# Patient Record
Sex: FEMALE | Race: WHITE | NOT HISPANIC OR LATINO | URBAN - METROPOLITAN AREA
[De-identification: names, ages, dates, MRNs, and addresses within clinical notes are randomized per-mention and may not be internally consistent; named-entity substitution may affect disease eponyms.]

---

## 2017-11-19 ENCOUNTER — INPATIENT (INPATIENT)
Facility: HOSPITAL | Age: 82
LOS: 0 days | Discharge: HOME | DRG: 689 | End: 2017-11-20
Attending: INTERNAL MEDICINE | Admitting: INTERNAL MEDICINE
Payer: MEDICARE

## 2017-11-19 VITALS
RESPIRATION RATE: 18 BRPM | SYSTOLIC BLOOD PRESSURE: 118 MMHG | TEMPERATURE: 99 F | OXYGEN SATURATION: 94 % | DIASTOLIC BLOOD PRESSURE: 78 MMHG | HEART RATE: 105 BPM

## 2017-11-19 DIAGNOSIS — N39.0 URINARY TRACT INFECTION, SITE NOT SPECIFIED: ICD-10-CM

## 2017-11-19 DIAGNOSIS — E78.5 HYPERLIPIDEMIA, UNSPECIFIED: ICD-10-CM

## 2017-11-19 DIAGNOSIS — Z29.9 ENCOUNTER FOR PROPHYLACTIC MEASURES, UNSPECIFIED: ICD-10-CM

## 2017-11-19 DIAGNOSIS — A41.9 SEPSIS, UNSPECIFIED ORGANISM: ICD-10-CM

## 2017-11-19 DIAGNOSIS — R63.8 OTHER SYMPTOMS AND SIGNS CONCERNING FOOD AND FLUID INTAKE: ICD-10-CM

## 2017-11-19 LAB
ALBUMIN SERPL ELPH-MCNC: 3.8 G/DL — SIGNIFICANT CHANGE UP (ref 3.3–5)
ALP SERPL-CCNC: 59 U/L — SIGNIFICANT CHANGE UP (ref 40–120)
ALT FLD-CCNC: 11 U/L — SIGNIFICANT CHANGE UP (ref 10–45)
ANION GAP SERPL CALC-SCNC: 15 MMOL/L — SIGNIFICANT CHANGE UP (ref 5–17)
APPEARANCE UR: CLEAR — SIGNIFICANT CHANGE UP
AST SERPL-CCNC: 20 U/L — SIGNIFICANT CHANGE UP (ref 10–40)
BACTERIA # UR AUTO: (no result) /HPF
BASOPHILS NFR BLD AUTO: 1 % — SIGNIFICANT CHANGE UP (ref 0–2)
BILIRUB SERPL-MCNC: 0.6 MG/DL — SIGNIFICANT CHANGE UP (ref 0.2–1.2)
BILIRUB UR-MCNC: NEGATIVE — SIGNIFICANT CHANGE UP
BUN SERPL-MCNC: 17 MG/DL — SIGNIFICANT CHANGE UP (ref 7–23)
CALCIUM SERPL-MCNC: 9 MG/DL — SIGNIFICANT CHANGE UP (ref 8.4–10.5)
CHLORIDE SERPL-SCNC: 97 MMOL/L — SIGNIFICANT CHANGE UP (ref 96–108)
CO2 SERPL-SCNC: 25 MMOL/L — SIGNIFICANT CHANGE UP (ref 22–31)
COLOR SPEC: YELLOW — SIGNIFICANT CHANGE UP
CREAT SERPL-MCNC: 0.88 MG/DL — SIGNIFICANT CHANGE UP (ref 0.5–1.3)
DIFF PNL FLD: NEGATIVE — SIGNIFICANT CHANGE UP
EOSINOPHIL NFR BLD AUTO: 0.2 % — SIGNIFICANT CHANGE UP (ref 0–6)
EPI CELLS # UR: SIGNIFICANT CHANGE UP /HPF (ref 0–5)
GLUCOSE SERPL-MCNC: 130 MG/DL — HIGH (ref 70–99)
GLUCOSE UR QL: NEGATIVE — SIGNIFICANT CHANGE UP
HCT VFR BLD CALC: 42.1 % — SIGNIFICANT CHANGE UP (ref 34.5–45)
HGB BLD-MCNC: 14 G/DL — SIGNIFICANT CHANGE UP (ref 11.5–15.5)
KETONES UR-MCNC: NEGATIVE — SIGNIFICANT CHANGE UP
LACTATE SERPL-SCNC: 2 MMOL/L — SIGNIFICANT CHANGE UP (ref 0.5–2)
LACTATE SERPL-SCNC: 2.8 MMOL/L — HIGH (ref 0.5–2)
LEUKOCYTE ESTERASE UR-ACNC: (no result)
LYMPHOCYTES # BLD AUTO: 4 % — LOW (ref 13–44)
MACROCYTES BLD QL: SLIGHT — SIGNIFICANT CHANGE UP
MCHC RBC-ENTMCNC: 33.3 G/DL — SIGNIFICANT CHANGE UP (ref 32–36)
MCHC RBC-ENTMCNC: 35.6 PG — HIGH (ref 27–34)
MCV RBC AUTO: 107.1 FL — HIGH (ref 80–100)
MONOCYTES NFR BLD AUTO: 9 % — SIGNIFICANT CHANGE UP (ref 2–14)
NEUTROPHILS NFR BLD AUTO: 79 % — HIGH (ref 43–77)
NEUTS BAND # BLD: 7 % — SIGNIFICANT CHANGE UP
NITRITE UR-MCNC: POSITIVE
PH UR: 6.5 — SIGNIFICANT CHANGE UP (ref 5–8)
PLAT MORPH BLD: NORMAL — SIGNIFICANT CHANGE UP
PLATELET # BLD AUTO: 270 K/UL — SIGNIFICANT CHANGE UP (ref 150–400)
POTASSIUM SERPL-MCNC: 4.2 MMOL/L — SIGNIFICANT CHANGE UP (ref 3.5–5.3)
POTASSIUM SERPL-SCNC: 4.2 MMOL/L — SIGNIFICANT CHANGE UP (ref 3.5–5.3)
PROT SERPL-MCNC: 7.9 G/DL — SIGNIFICANT CHANGE UP (ref 6–8.3)
PROT UR-MCNC: NEGATIVE MG/DL — SIGNIFICANT CHANGE UP
RAPID RVP RESULT: SIGNIFICANT CHANGE UP
RBC # BLD: 3.93 M/UL — SIGNIFICANT CHANGE UP (ref 3.8–5.2)
RBC # FLD: 12.8 % — SIGNIFICANT CHANGE UP (ref 10.3–16.9)
RBC BLD AUTO: (no result)
RBC CASTS # UR COMP ASSIST: < 5 /HPF — SIGNIFICANT CHANGE UP
SODIUM SERPL-SCNC: 137 MMOL/L — SIGNIFICANT CHANGE UP (ref 135–145)
SP GR SPEC: <=1.005 — SIGNIFICANT CHANGE UP (ref 1–1.03)
UROBILINOGEN FLD QL: 0.2 E.U./DL — SIGNIFICANT CHANGE UP
WBC # BLD: 18.2 K/UL — HIGH (ref 3.8–10.5)
WBC # FLD AUTO: 18.2 K/UL — HIGH (ref 3.8–10.5)
WBC UR QL: (no result) /HPF

## 2017-11-19 PROCEDURE — 93010 ELECTROCARDIOGRAM REPORT: CPT

## 2017-11-19 PROCEDURE — 99222 1ST HOSP IP/OBS MODERATE 55: CPT | Mod: GC

## 2017-11-19 PROCEDURE — 99285 EMERGENCY DEPT VISIT HI MDM: CPT | Mod: 25

## 2017-11-19 PROCEDURE — 70450 CT HEAD/BRAIN W/O DYE: CPT | Mod: 26

## 2017-11-19 PROCEDURE — 71020: CPT | Mod: 26

## 2017-11-19 RX ORDER — ATORVASTATIN CALCIUM 80 MG/1
20 TABLET, FILM COATED ORAL AT BEDTIME
Qty: 0 | Refills: 0 | Status: DISCONTINUED | OUTPATIENT
Start: 2017-11-19 | End: 2017-11-20

## 2017-11-19 RX ORDER — SODIUM CHLORIDE 9 MG/ML
1000 INJECTION INTRAMUSCULAR; INTRAVENOUS; SUBCUTANEOUS ONCE
Qty: 0 | Refills: 0 | Status: COMPLETED | OUTPATIENT
Start: 2017-11-19 | End: 2017-11-19

## 2017-11-19 RX ORDER — INFLUENZA VIRUS VACCINE 15; 15; 15; 15 UG/.5ML; UG/.5ML; UG/.5ML; UG/.5ML
0.5 SUSPENSION INTRAMUSCULAR ONCE
Qty: 0 | Refills: 0 | Status: DISCONTINUED | OUTPATIENT
Start: 2017-11-19 | End: 2017-11-20

## 2017-11-19 RX ORDER — GABAPENTIN 400 MG/1
300 CAPSULE ORAL DAILY
Qty: 0 | Refills: 0 | Status: DISCONTINUED | OUTPATIENT
Start: 2017-11-19 | End: 2017-11-20

## 2017-11-19 RX ORDER — LANOLIN ALCOHOL/MO/W.PET/CERES
3 CREAM (GRAM) TOPICAL AT BEDTIME
Qty: 0 | Refills: 0 | Status: DISCONTINUED | OUTPATIENT
Start: 2017-11-19 | End: 2017-11-20

## 2017-11-19 RX ORDER — ACETAMINOPHEN 500 MG
650 TABLET ORAL EVERY 6 HOURS
Qty: 0 | Refills: 0 | Status: DISCONTINUED | OUTPATIENT
Start: 2017-11-19 | End: 2017-11-20

## 2017-11-19 RX ORDER — CEFTRIAXONE 500 MG/1
1 INJECTION, POWDER, FOR SOLUTION INTRAMUSCULAR; INTRAVENOUS ONCE
Qty: 0 | Refills: 0 | Status: COMPLETED | OUTPATIENT
Start: 2017-11-19 | End: 2017-11-19

## 2017-11-19 RX ORDER — CEFTRIAXONE 500 MG/1
1 INJECTION, POWDER, FOR SOLUTION INTRAMUSCULAR; INTRAVENOUS EVERY 24 HOURS
Qty: 0 | Refills: 0 | Status: DISCONTINUED | OUTPATIENT
Start: 2017-11-20 | End: 2017-11-20

## 2017-11-19 RX ORDER — AZITHROMYCIN 500 MG/1
500 TABLET, FILM COATED ORAL ONCE
Qty: 0 | Refills: 0 | Status: COMPLETED | OUTPATIENT
Start: 2017-11-19 | End: 2017-11-19

## 2017-11-19 RX ADMIN — ATORVASTATIN CALCIUM 20 MILLIGRAM(S): 80 TABLET, FILM COATED ORAL at 22:01

## 2017-11-19 RX ADMIN — Medication 3 MILLIGRAM(S): at 23:51

## 2017-11-19 RX ADMIN — SODIUM CHLORIDE 2000 MILLILITER(S): 9 INJECTION INTRAMUSCULAR; INTRAVENOUS; SUBCUTANEOUS at 14:35

## 2017-11-19 RX ADMIN — SODIUM CHLORIDE 2000 MILLILITER(S): 9 INJECTION INTRAMUSCULAR; INTRAVENOUS; SUBCUTANEOUS at 15:41

## 2017-11-19 RX ADMIN — AZITHROMYCIN 255 MILLIGRAM(S): 500 TABLET, FILM COATED ORAL at 15:40

## 2017-11-19 RX ADMIN — CEFTRIAXONE 100 GRAM(S): 500 INJECTION, POWDER, FOR SOLUTION INTRAMUSCULAR; INTRAVENOUS at 14:50

## 2017-11-19 NOTE — H&P ADULT - PROBLEM SELECTOR PLAN 5
DVT PPX: Improve Score 1, no PPX needed  Ethics: Full Code  Disposition: Admit to [facility] for further management of care.

## 2017-11-19 NOTE — H&P ADULT - NSHPLABSRESULTS_GEN_ALL_CORE
LABS:                        14.0   18.2  )-----------( 270      ( 19 Nov 2017 14:47 )             42.1     11-19    137  |  97  |  17  ----------------------------<  130<H>  4.2   |  25  |  0.88    Ca    9.0      19 Nov 2017 14:47    TPro  7.9  /  Alb  3.8  /  TBili  0.6  /  DBili  x   /  AST  20  /  ALT  11  /  AlkPhos  59  11-19      Urinalysis Basic - ( 19 Nov 2017 16:49 )    Color: Yellow / Appearance: Clear / SG: <=1.005 / pH: x  Gluc: x / Ketone: NEGATIVE  / Bili: Negative / Urobili: 0.2 E.U./dL   Blood: x / Protein: NEGATIVE mg/dL / Nitrite: POSITIVE   Leuk Esterase: Trace / RBC: < 5 /HPF / WBC 5-10 /HPF   Sq Epi: x / Non Sq Epi: 0-5 /HPF / Bacteria: Many /HPF

## 2017-11-19 NOTE — H&P ADULT - ATTENDING COMMENTS
Agree with above note.    82 year old woman admitted sepsis secondary to UTI with altered mental status, fevers, and generalized weakness now all improved.    -Ceftriaxone 1g IV daily.  F/U urine cultures and deescalate based on culture data.  F/U blood cultures.  -Repeat CBC in the AM  -Tylenol as needed for fever  -Encourage ambulation and OOB to chair.  -Regular diet  -Restart crestor

## 2017-11-19 NOTE — H&P ADULT - PROBLEM SELECTOR PLAN 1
Patient with complaints of nonproductive cough x 3 days, shaking chills last night, urinary frequency x 3 days and generalized weakness since this morning.   - Severe Sepsis criteria: (WBC 18.2, Lactate 2.8, T 100.4, +UA)  - Ceftriaxone 1g x 1 dose, Azithromycin 500mg x 1 dose for CAP in ED. Lactate 2.8 2L NS. Lactate s/p fluids 2.0. CXR unremarkable. CT Head: No intracranial hemorrhage or calvarial fracture. UA positive.  - f/u urine culture, blood culture  - F/u AM CBC/BMP  - c/w Ceftriaxone 1g q24h  - RVP ordered  - Tylenol for fever > 100.4

## 2017-11-19 NOTE — H&P ADULT - ASSESSMENT
An 83 y/o female with PMH of HLD presented to [facility] with complaints of nonproductive cough x 3 days, shaking chills last night, urinary frequency x 3 days and generalized weakness since this morning, found to have a positive UA, now admitted for UTI.

## 2017-11-19 NOTE — H&P ADULT - HISTORY OF PRESENT ILLNESS
Vital Signs Last 12 Hrs  T(F): 97.8 (11-19-17 @ 16:29), Max: 100.4 (11-19-17 @ 14:31)  HR: 87 (11-19-17 @ 16:29) (87 - 105)  BP: 121/77 (11-19-17 @ 16:29) (112/60 - 121/77)  BP(mean): --  RR: 18 (11-19-17 @ 16:29) (18 - 18)  SpO2: 97% (11-19-17 @ 16:29) (94% - 97%)  I&O's Summary An 81 y/o female with PMH of HLD presented to [facility] with complaints of nonproductive cough x 3 days, shaking chills last night, urinary frequency x 3 days and generalized weakness since this morning. Patient was unable to walk, felt her legs were giving up and could not hold her. Her  assisted her to the bathroom. Denies fevers, CP, SOB, lightheadedness, dizziness, vision or hearing changes, neck stiffness, abdominal pain, N/V/D, urinary hesitancy or burning with urinary, lower extremity swelling. No recent sick contacts. No recent travel. No pets. No flu shot this year.     In [facility] ED, VS were: T 97.8 (Max: 100.4 at 2pm), HR: 87, BP: 121/77, RR: 18, SpO2: 97% on 2L NC. Ceftriaxone 1g x 1 dose, Azithromycin 500mg x 1 dose for CAP. Laste 2.8 2L NS. Lactate s/p fluids 2.0. CXR unremarkable. CT Head: No intracranial hemorrhage or calvarial fracture. UA positive.    Admitted to [facility] for further management of care. An 83 y/o female with PMH of HLD presented to [facility] with complaints of nonproductive cough x 3 days, shaking chills last night, urinary frequency x 3 days and generalized weakness since this morning. Patient was unable to walk, felt her legs were giving up and could not hold her. Her  assisted her to the bathroom. Denies fevers, CP, SOB, lightheadedness, dizziness, vision or hearing changes, neck stiffness, abdominal pain, N/V/D, urinary hesitancy or burning with urinary, lower extremity swelling. No recent sick contacts. No recent travel. No pets. No flu shot this year.     In the ED, VS were: T 97.8 (Max: 100.4 at 2pm), HR: 87, BP: 121/77, RR: 18, SpO2: 97% on 2L NC. Ceftriaxone 1g x 1 dose, Azithromycin 500mg x 1 dose for CAP. Lactate 2.8 2L NS. Lactate s/p fluids 2.0. CXR unremarkable. CT Head: No intracranial hemorrhage or calvarial fracture. UA positive.    Admitted to [email or web address] for further management of care.

## 2017-11-19 NOTE — ED PROVIDER NOTE - MEDICAL DECISION MAKING DETAILS
weakness, chills, fever, and cough with hx of fall. ct head no acute pathology. likely sepsis. labs noted. antibiotics given and cx sent. + UTI on u/a. will plan for admission

## 2017-11-19 NOTE — H&P ADULT - PROBLEM SELECTOR PLAN 4
F: No IVF, tolerates PO diet  E: Replete electrolytes PRN  N: DASH/TLC F: No IVF, tolerates PO diet  E: Replete electrolytes PRN  N: Regular Diet

## 2017-11-19 NOTE — H&P ADULT - NSHPOUTPATIENTPROVIDERS_GEN_ALL_CORE
PCP: PCP: [facility]: [address], [place] [address]  Phone: [phone] PCP: Justin Mixon [phone]

## 2017-11-19 NOTE — H&P ADULT - NSHPSOCIALHISTORY_GEN_ALL_CORE
Cigarette Use:  ETOH Use:  Recreational Drug Use:  Occupation:   Sexual History: Cigarette Use: Former smoker, quit 20 years ago  ETOH Use: Social drinker, 1 glass of scotch with water a night  Recreational Drug Use: Denies cocaine, marijuana use  Occupation: Retired  Sexual History: Denies

## 2017-11-19 NOTE — ED ADULT NURSE NOTE - OBJECTIVE STATEMENT
Pt presents to ED c/o weakness. Per pt and  pt with wet non productive cough for the last few days, pt also with chills last night. Per  early this morning approx 4 am per  pt needed assistance out of bed which is unusual for her, pt was too weak to walk to the bathroom and ended up falling onto her buttocks and urinated on herself. On arrival to ED pt endorses generalized weakness but denies pain. Pt denies HA, dizziness, lightheadedness, visual changes, numbness/tingling, neck pain, CP, SOB, N/V/D, difficulty urinating, difficulty passing BM. Pt presents in NAD speaking full sentences via EMS stretcher through triage. EKG preformed on arrival to bed 16.

## 2017-11-19 NOTE — H&P ADULT - NSHPPHYSICALEXAM_GEN_ALL_CORE
PHYSICAL EXAM:    Constitutional: NAD, AAOx3, comfortable in bed.   Respiratory: Normal rate, rhythm, depth, effort. CTAB. No w/r/r.   Cardiovascular: RRR, normal S1 and S2, no m/r/g.   Gastrointestinal: +BS, soft NTND.   Extremities: wwp, no edema.   Vascular: Pulses equal and strong throughout.   Neurological: Cranial nerves grossly intact, strength and sensation intact throughout.   Skin: No gross skin abnormalities. PHYSICAL EXAM:    Constitutional: NAD, AAOx3, comfortable in bed.   HEENT: EOMI, PERRL, MMM, anicteric, no JVD, no LAD, neck supple  Respiratory: Normal rate, rhythm, depth, effort. CTAB. No w/r/r.   Cardiovascular: RRR, normal S1 and S2, no m/r/g.   Gastrointestinal: +BS x 4, soft NTND. No guarding or rigidity  : no CVA tenderness, no suprapubic tenderness  Extremities: wwp, no peripheral edema.   Vascular: Pulses equal and strong throughout.   Neurological: Cranial nerves grossly intact, strength and sensation intact throughout.   Skin: No gross skin abnormalities. PHYSICAL EXAM:    Constitutional: NAD, AAOx3, comfortable in bed.   HEENT: EOMI, PERRL, MMM, anicteric, no JVD, no LAD, neck supple  Respiratory: Normal rate, rhythm, depth, effort. CTAB. No w/r/r.   Cardiovascular: RRR, normal S1 and S2, no m/r/g.   Gastrointestinal: +BS x 4, soft NTND. No guarding or rigidity  : no CVA tenderness, minimal suprapubic tenderness  Extremities: wwp, no peripheral edema.   Vascular: Pulses equal and strong throughout.   Neurological: Cranial nerves grossly intact, strength and sensation intact throughout.   Skin: No gross skin abnormalities.

## 2017-11-19 NOTE — ED ADULT TRIAGE NOTE - ARRIVAL INFO ADDITIONAL COMMENTS
Patient reports pain to right hip s/p fall. EMS reports patient's O2 sat on their arrival was 91% on room air. Reports cough x few weeks. Spouse reports patient has been more sleepy today than usual and reports chills last night. Patient is A&Ox3 on arrival. Reports head injury, denies any LOC, dizziness, or headache. Patient presents with IV#20g to left hand, received 250ml NS in the field.

## 2017-11-19 NOTE — ED PROVIDER NOTE - OBJECTIVE STATEMENT
83 y/o female with hx of HLD c/o weakness. pt states non productive cough for few days. pt reports feeling weak since 4 am today and  reports she has not been answering questions well. + chills and ? fever.  assisting pt to restroom today and pt with multiple falls. no hip or leg pain. no abd pain, n/v/d. no ha or dizziness. no urinary sx's.  no further complaints.

## 2017-11-20 VITALS
DIASTOLIC BLOOD PRESSURE: 83 MMHG | TEMPERATURE: 97 F | RESPIRATION RATE: 18 BRPM | OXYGEN SATURATION: 96 % | SYSTOLIC BLOOD PRESSURE: 125 MMHG | HEART RATE: 78 BPM

## 2017-11-20 LAB
ANION GAP SERPL CALC-SCNC: 12 MMOL/L — SIGNIFICANT CHANGE UP (ref 5–17)
BUN SERPL-MCNC: 15 MG/DL — SIGNIFICANT CHANGE UP (ref 7–23)
CALCIUM SERPL-MCNC: 8.3 MG/DL — LOW (ref 8.4–10.5)
CHLORIDE SERPL-SCNC: 106 MMOL/L — SIGNIFICANT CHANGE UP (ref 96–108)
CO2 SERPL-SCNC: 22 MMOL/L — SIGNIFICANT CHANGE UP (ref 22–31)
CREAT SERPL-MCNC: 0.65 MG/DL — SIGNIFICANT CHANGE UP (ref 0.5–1.3)
GLUCOSE SERPL-MCNC: 97 MG/DL — SIGNIFICANT CHANGE UP (ref 70–99)
HCT VFR BLD CALC: 36.2 % — SIGNIFICANT CHANGE UP (ref 34.5–45)
HGB BLD-MCNC: 11.5 G/DL — SIGNIFICANT CHANGE UP (ref 11.5–15.5)
MAGNESIUM SERPL-MCNC: 2.2 MG/DL — SIGNIFICANT CHANGE UP (ref 1.6–2.6)
MCHC RBC-ENTMCNC: 31.8 G/DL — LOW (ref 32–36)
MCHC RBC-ENTMCNC: 34.8 PG — HIGH (ref 27–34)
MCV RBC AUTO: 109.7 FL — HIGH (ref 80–100)
PHOSPHATE SERPL-MCNC: 2.2 MG/DL — LOW (ref 2.5–4.5)
PLATELET # BLD AUTO: 219 K/UL — SIGNIFICANT CHANGE UP (ref 150–400)
POTASSIUM SERPL-MCNC: 3.4 MMOL/L — LOW (ref 3.5–5.3)
POTASSIUM SERPL-SCNC: 3.4 MMOL/L — LOW (ref 3.5–5.3)
RBC # BLD: 3.3 M/UL — LOW (ref 3.8–5.2)
RBC # FLD: 12.7 % — SIGNIFICANT CHANGE UP (ref 10.3–16.9)
SODIUM SERPL-SCNC: 140 MMOL/L — SIGNIFICANT CHANGE UP (ref 135–145)
WBC # BLD: 6.8 K/UL — SIGNIFICANT CHANGE UP (ref 3.8–10.5)
WBC # FLD AUTO: 6.8 K/UL — SIGNIFICANT CHANGE UP (ref 3.8–10.5)

## 2017-11-20 PROCEDURE — 99239 HOSP IP/OBS DSCHRG MGMT >30: CPT

## 2017-11-20 RX ORDER — ROSUVASTATIN CALCIUM 5 MG/1
1 TABLET ORAL
Qty: 0 | Refills: 0 | COMMUNITY

## 2017-11-20 RX ORDER — ROSUVASTATIN CALCIUM 5 MG/1
1 TABLET ORAL
Qty: 30 | Refills: 1 | OUTPATIENT
Start: 2017-11-20 | End: 2018-01-18

## 2017-11-20 RX ORDER — CEFPODOXIME PROXETIL 100 MG
1 TABLET ORAL
Qty: 10 | Refills: 0 | OUTPATIENT
Start: 2017-11-20 | End: 2017-11-25

## 2017-11-20 RX ORDER — POTASSIUM CHLORIDE 20 MEQ
20 PACKET (EA) ORAL
Qty: 0 | Refills: 0 | Status: COMPLETED | OUTPATIENT
Start: 2017-11-20 | End: 2017-11-20

## 2017-11-20 RX ORDER — ACETAMINOPHEN 500 MG
650 TABLET ORAL ONCE
Qty: 0 | Refills: 0 | Status: COMPLETED | OUTPATIENT
Start: 2017-11-20 | End: 2017-11-20

## 2017-11-20 RX ORDER — GABAPENTIN 400 MG/1
1 CAPSULE ORAL
Qty: 90 | Refills: 0 | OUTPATIENT
Start: 2017-11-20 | End: 2017-12-20

## 2017-11-20 RX ORDER — GABAPENTIN 400 MG/1
1 CAPSULE ORAL
Qty: 0 | Refills: 0 | COMMUNITY

## 2017-11-20 RX ADMIN — GABAPENTIN 300 MILLIGRAM(S): 400 CAPSULE ORAL at 11:36

## 2017-11-20 RX ADMIN — Medication 20 MILLIEQUIVALENT(S): at 14:11

## 2017-11-20 RX ADMIN — CEFTRIAXONE 100 GRAM(S): 500 INJECTION, POWDER, FOR SOLUTION INTRAMUSCULAR; INTRAVENOUS at 10:23

## 2017-11-20 RX ADMIN — Medication 650 MILLIGRAM(S): at 02:03

## 2017-11-20 RX ADMIN — Medication 20 MILLIEQUIVALENT(S): at 11:36

## 2017-11-20 RX ADMIN — Medication 650 MILLIGRAM(S): at 12:30

## 2017-11-20 RX ADMIN — Medication 650 MILLIGRAM(S): at 11:36

## 2017-11-20 RX ADMIN — Medication 20 MILLIEQUIVALENT(S): at 10:23

## 2017-11-20 NOTE — DISCHARGE NOTE ADULT - ADDITIONAL INSTRUCTIONS
Please follow up with your PCP for further management of care. Please return to the ED if symptoms of urinary burning and frequency do not resolve.

## 2017-11-20 NOTE — PHYSICAL THERAPY INITIAL EVALUATION ADULT - PERTINENT HX OF CURRENT PROBLEM, REHAB EVAL
Pt. is an 82 y.o. female admitted with c/o dry cough, increased urinary frequency x 3 days, gen. weakness. Pt. was found to be septic due to UTI on admission..

## 2017-11-20 NOTE — DISCHARGE NOTE ADULT - CARE PROVIDER_API CALL
Justin Mixon  [address] [place] [address] [address], [place] [address]  Phone: [phone]  Fax: (       -

## 2017-11-20 NOTE — DISCHARGE NOTE ADULT - PATIENT PORTAL LINK FT
“You can access the Follow[employer] Patient Portal, offered by [facility], by registering with the following website: [email or web address]”

## 2017-11-20 NOTE — DISCHARGE NOTE ADULT - PLAN OF CARE
Decrease infection You presented with complaints of nonproductive cough for 3 days, shaking chills, urinary frequency x 3 days and generalized weakness. You met severe sepsis criteria: (WBC 18.2, Lactate 2.8, T 100.4, Positive Urine Analysis). We started you on antibiotics (Ceftriaxone 1g daily) and gave you fluids in the ED. Chest xray was unremarkable. CT Head showed no intracranial hemorrhage or calvarial fracture. Respiratory viral panel was negative. We gave you Tylenol for fever. Please continue to take Cefpodoxamine 100mg twice a day for 5 days. Please follow up with your PCP for further management of care. Please return to the ED if symptoms of urinary burning and frequency do not resolve. Manage lipid levels Continue taking Crestor 20mg daily. Please follow up with your PCP for further management of care.

## 2017-11-20 NOTE — DISCHARGE NOTE ADULT - HOSPITAL COURSE
An 81 y/o female with PMH of HLD presented to [facility] with complaints of nonproductive cough x 3 days, shaking chills last night, urinary frequency x 3 days and generalized weakness since this morning. Patient was unable to walk, felt her legs were giving up and could not hold her. Her  assisted her to the bathroom. Denies fevers, CP, SOB, lightheadedness, dizziness, vision or hearing changes, neck stiffness, abdominal pain, N/V/D, urinary hesitancy or burning with urinary, lower extremity swelling. No recent sick contacts. No recent travel. No pets. No flu shot this year. In the ED, VS were: T 97.8 (Max: 100.4 at 2pm), HR: 87, BP: 121/77, RR: 18, SpO2: 97% on 2L NC. Ceftriaxone 1g x 1 dose, Azithromycin 500mg x 1 dose for CAP. Lactate 2.8 2L NS. Lactate s/p fluids 2.0. CXR unremarkable. CT Head: No intracranial hemorrhage or calvarial fracture. UA positive. RVP negative. Tylenol for Fever. Admitted to [facility] for severe sepsis 2/2 UTI.     Patient hemodynamically stable and ready for discharge with cefpodoxime 100mg BID for 5 days.

## 2017-11-20 NOTE — DISCHARGE NOTE ADULT - PROVIDER TOKENS
FREE:[LAST:[[facility]],FIRST:[Justin],PHONE:[phone]],FAX:[(   )    -],ADDRESS:[[address] [place] [address]]]

## 2017-11-20 NOTE — DISCHARGE NOTE ADULT - MEDICATION SUMMARY - MEDICATIONS TO TAKE
I will START or STAY ON the medications listed below when I get home from the hospital:    gabapentin 300 mg oral capsule  -- 1 cap(s) by mouth 3 times a day  -- Indication: For Nutrition, metabolism, and development symptoms    Crestor 20 mg oral tablet  -- 1 tab(s) by mouth once a day (at bedtime)  -- Indication: For HLD (hyperlipidemia)    cefpodoxime 100 mg oral tablet  -- 1 tab(s) by mouth 2 times a day for 5 days  -- Finish all this medication unless otherwise directed by prescriber.  Take with food or milk.    -- Indication: For UTI (urinary tract infection)

## 2017-11-20 NOTE — DISCHARGE NOTE ADULT - CARE PLAN
Principal Discharge DX:	UTI (urinary tract infection)  Goal:	Decrease infection  Instructions for follow-up, activity and diet:	You presented with complaints of nonproductive cough for 3 days, shaking chills, urinary frequency x 3 days and generalized weakness. You met severe sepsis criteria: (WBC 18.2, Lactate 2.8, T 100.4, Positive Urine Analysis). We started you on antibiotics (Ceftriaxone 1g daily) and gave you fluids in the ED. Chest xray was unremarkable. CT Head showed no intracranial hemorrhage or calvarial fracture. Respiratory viral panel was negative. We gave you Tylenol for fever. Please continue to take Cefpodoxamine 100mg twice a day for 5 days. Please follow up with your PCP for further management of care. Please return to the ED if symptoms of urinary burning and frequency do not resolve.  Secondary Diagnosis:	HLD (hyperlipidemia)  Goal:	Manage lipid levels  Instructions for follow-up, activity and diet:	Continue taking Crestor 20mg daily. Please follow up with your PCP for further management of care.

## 2017-11-21 LAB
CULTURE RESULTS: SIGNIFICANT CHANGE UP
SPECIMEN SOURCE: SIGNIFICANT CHANGE UP

## 2017-11-22 DIAGNOSIS — Z88.0 ALLERGY STATUS TO PENICILLIN: ICD-10-CM

## 2017-11-22 DIAGNOSIS — G93.41 METABOLIC ENCEPHALOPATHY: ICD-10-CM

## 2017-11-22 DIAGNOSIS — N39.0 URINARY TRACT INFECTION, SITE NOT SPECIFIED: ICD-10-CM

## 2017-11-22 DIAGNOSIS — Z28.21 IMMUNIZATION NOT CARRIED OUT BECAUSE OF PATIENT REFUSAL: ICD-10-CM

## 2017-11-22 DIAGNOSIS — D53.9 NUTRITIONAL ANEMIA, UNSPECIFIED: ICD-10-CM

## 2017-11-22 DIAGNOSIS — Z88.2 ALLERGY STATUS TO SULFONAMIDES: ICD-10-CM

## 2017-11-22 DIAGNOSIS — Z91.81 HISTORY OF FALLING: ICD-10-CM

## 2017-11-22 DIAGNOSIS — E78.5 HYPERLIPIDEMIA, UNSPECIFIED: ICD-10-CM

## 2017-11-22 DIAGNOSIS — E87.6 HYPOKALEMIA: ICD-10-CM

## 2017-11-25 LAB
CULTURE RESULTS: SIGNIFICANT CHANGE UP
CULTURE RESULTS: SIGNIFICANT CHANGE UP
SPECIMEN SOURCE: SIGNIFICANT CHANGE UP
SPECIMEN SOURCE: SIGNIFICANT CHANGE UP

## 2017-12-19 PROCEDURE — 87633 RESP VIRUS 12-25 TARGETS: CPT

## 2017-12-19 PROCEDURE — 87798 DETECT AGENT NOS DNA AMP: CPT

## 2017-12-19 PROCEDURE — 71046 X-RAY EXAM CHEST 2 VIEWS: CPT

## 2017-12-19 PROCEDURE — 87086 URINE CULTURE/COLONY COUNT: CPT

## 2017-12-19 PROCEDURE — 80053 COMPREHEN METABOLIC PANEL: CPT

## 2017-12-19 PROCEDURE — 36415 COLL VENOUS BLD VENIPUNCTURE: CPT

## 2017-12-19 PROCEDURE — 83735 ASSAY OF MAGNESIUM: CPT

## 2017-12-19 PROCEDURE — 87486 CHLMYD PNEUM DNA AMP PROBE: CPT

## 2017-12-19 PROCEDURE — 87040 BLOOD CULTURE FOR BACTERIA: CPT

## 2017-12-19 PROCEDURE — 84100 ASSAY OF PHOSPHORUS: CPT

## 2017-12-19 PROCEDURE — 81001 URINALYSIS AUTO W/SCOPE: CPT

## 2017-12-19 PROCEDURE — 99285 EMERGENCY DEPT VISIT HI MDM: CPT | Mod: 25

## 2017-12-19 PROCEDURE — 80048 BASIC METABOLIC PNL TOTAL CA: CPT

## 2017-12-19 PROCEDURE — 85027 COMPLETE CBC AUTOMATED: CPT

## 2017-12-19 PROCEDURE — 97161 PT EVAL LOW COMPLEX 20 MIN: CPT

## 2017-12-19 PROCEDURE — 96374 THER/PROPH/DIAG INJ IV PUSH: CPT

## 2017-12-19 PROCEDURE — 93005 ELECTROCARDIOGRAM TRACING: CPT

## 2017-12-19 PROCEDURE — 96375 TX/PRO/DX INJ NEW DRUG ADDON: CPT

## 2017-12-19 PROCEDURE — 70450 CT HEAD/BRAIN W/O DYE: CPT

## 2017-12-19 PROCEDURE — 85025 COMPLETE CBC W/AUTO DIFF WBC: CPT

## 2017-12-19 PROCEDURE — 87581 M.PNEUMON DNA AMP PROBE: CPT

## 2017-12-19 PROCEDURE — 83605 ASSAY OF LACTIC ACID: CPT

## 2020-02-14 NOTE — DISCHARGE NOTE ADULT - ABILITY TO HEAR (WITH HEARING AID OR HEARING APPLIANCE IF NORMALLY USED):
Adequate: hears normal conversation without difficulty 0 = swallows foods/liquids without difficulty

## 2021-12-02 NOTE — ED PROVIDER NOTE - NS ED MD DISPO ADMIT [facility]
Pharmacy is out of [employer].  Please resend to:      [facility] [address] [place]   MEDICINE

## 2024-11-24 NOTE — PHYSICAL THERAPY INITIAL EVALUATION ADULT - DISCHARGE DISPOSITION, PT EVAL
PAST MEDICAL HISTORY:  Chronic retention of urine     COPD (chronic obstructive pulmonary disease)     COPD (chronic obstructive pulmonary disease)     Diabetes type 2, controlled     Dyslipidemia     Dyslipidemia     Paranoid schizophrenia     Schizophrenia     
home